# Patient Record
Sex: MALE | Race: WHITE
[De-identification: names, ages, dates, MRNs, and addresses within clinical notes are randomized per-mention and may not be internally consistent; named-entity substitution may affect disease eponyms.]

---

## 2020-12-29 ENCOUNTER — HOSPITAL ENCOUNTER (EMERGENCY)
Dept: HOSPITAL 47 - EC | Age: 80
Discharge: HOME | End: 2020-12-29
Payer: OTHER GOVERNMENT

## 2020-12-29 VITALS — DIASTOLIC BLOOD PRESSURE: 74 MMHG | HEART RATE: 99 BPM | SYSTOLIC BLOOD PRESSURE: 124 MMHG

## 2020-12-29 VITALS — RESPIRATION RATE: 18 BRPM

## 2020-12-29 VITALS — TEMPERATURE: 98.8 F

## 2020-12-29 DIAGNOSIS — Z87.891: ICD-10-CM

## 2020-12-29 DIAGNOSIS — Z79.01: ICD-10-CM
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
## 2020-12-29 DIAGNOSIS — I10: ICD-10-CM

## 2020-12-29 DIAGNOSIS — Z23: ICD-10-CM

## 2020-12-29 DIAGNOSIS — Z98.890: ICD-10-CM
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
## 2020-12-29 DIAGNOSIS — S01.01XA: Primary | ICD-10-CM

## 2020-12-29 DIAGNOSIS — T74.11XA: ICD-10-CM

## 2020-12-29 PROCEDURE — 12001 RPR S/N/AX/GEN/TRNK 2.5CM/<: CPT

## 2020-12-29 PROCEDURE — 70450 CT HEAD/BRAIN W/O DYE: CPT

## 2020-12-29 PROCEDURE — 90715 TDAP VACCINE 7 YRS/> IM: CPT

## 2020-12-29 PROCEDURE — 90471 IMMUNIZATION ADMIN: CPT

## 2020-12-29 PROCEDURE — 99285 EMERGENCY DEPT VISIT HI MDM: CPT

## 2020-12-29 NOTE — CT
EXAM:

  CT Head Without Intravenous Contrast

 

CLINICAL HISTORY:

  ITS.REASON CT Reason: pain, trauma

 

TECHNIQUE:

  Axial computed tomography images of the head/brain without intravenous 

contrast.  CTDI is 49.27 mGy and DLP is 1039.4 mGy-cm.  This CT exam was 

performed using one or more of the following dose reduction techniques: 

automated exposure control, adjustment of the mA and/or kV according to 

patient size, and/or use of iterative reconstruction technique.

 

COMPARISON:

  none available

 

FINDINGS:

  Brain: No acute intracranial hemorrhage.  Gray-white differentiation is 

preserved throughout the cerebral hemispheres.  Posterior fossa is 

symmetric.  There is patchy periventricular white matter hypoattenuation 

which is nonspecific most commonly seen in the setting of small vessel 

ischemic disease.  There is moderate intracranial internal carotid artery 

calcifications.

  Ventricles:  Unremarkable.  No ventriculomegaly.

  Bones/joints:  Unremarkable.  No acute fracture.

  Soft tissues:  Right frontal scalp soft tissue swelling.

  Sinuses:  Unremarkable as visualized.  No acute sinusitis.

  Mastoid air cells:  Unremarkable as visualized.  No mastoid effusion.

 

IMPRESSION:     

1. Right frontal scalp swelling with no underlying calvarial fracture. No 

skull base fractures.

 

2. No acute intracranial hemorrhage, herniation, or hydrocephalus.
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
